# Patient Record
Sex: MALE | Race: WHITE | URBAN - METROPOLITAN AREA
[De-identification: names, ages, dates, MRNs, and addresses within clinical notes are randomized per-mention and may not be internally consistent; named-entity substitution may affect disease eponyms.]

---

## 2017-05-07 ENCOUNTER — OFFICE VISIT (OUTPATIENT)
Dept: FAMILY MEDICINE CLINIC | Facility: CLINIC | Age: 77
End: 2017-05-07

## 2017-05-07 VITALS
TEMPERATURE: 98 F | RESPIRATION RATE: 20 BRPM | SYSTOLIC BLOOD PRESSURE: 142 MMHG | HEART RATE: 72 BPM | WEIGHT: 233 LBS | BODY MASS INDEX: 34.51 KG/M2 | DIASTOLIC BLOOD PRESSURE: 82 MMHG | OXYGEN SATURATION: 97 % | HEIGHT: 69 IN

## 2017-05-07 DIAGNOSIS — I10 ELEVATED BLOOD PRESSURE READING WITH DIAGNOSIS OF HYPERTENSION: ICD-10-CM

## 2017-05-07 DIAGNOSIS — J22 ACUTE LOWER RESPIRATORY INFECTION: Primary | ICD-10-CM

## 2017-05-07 PROCEDURE — 99202 OFFICE O/P NEW SF 15 MIN: CPT | Performed by: PHYSICIAN ASSISTANT

## 2017-05-07 RX ORDER — LEVOTHYROXINE SODIUM 0.15 MG/1
TABLET ORAL
Refills: 0 | COMMUNITY
Start: 2017-03-29

## 2017-05-07 RX ORDER — AMLODIPINE BESYLATE 5 MG/1
TABLET ORAL
Refills: 2 | COMMUNITY
Start: 2017-03-29

## 2017-05-07 RX ORDER — BENAZEPRIL HYDROCHLORIDE 20 MG/1
TABLET ORAL
Refills: 0 | COMMUNITY
Start: 2017-03-29

## 2017-05-07 RX ORDER — CODEINE PHOSPHATE AND GUAIFENESIN 10; 100 MG/5ML; MG/5ML
5 SOLUTION ORAL EVERY 6 HOURS PRN
Qty: 120 ML | Refills: 0 | Status: SHIPPED | OUTPATIENT
Start: 2017-05-07

## 2017-05-07 RX ORDER — AZITHROMYCIN 250 MG/1
TABLET, FILM COATED ORAL
Qty: 6 TABLET | Refills: 0 | Status: SHIPPED | OUTPATIENT
Start: 2017-05-07 | End: 2017-05-12

## 2017-05-07 NOTE — PROGRESS NOTES
CHIEF COMPLAINT:   Patient presents with:  Cough/URI      HPI:   Carlitos Wood is a 77year old male who presents for upper respiratory symptoms for  7 days.  Patient reports sore throat only at the beginning of sx's, cough with whitish colored sputum, cou LUNGS: clear to auscultation bilaterally, no wheezes or rhonchi. Breathing is non labored. + hacking cough with cough jags  CARDIO: RRR with systolic murmur (pt aware- not new murmur)  LYMPH:  No cervical or submandibular lymphadenopathy.         ASSESSMENT Air travels in and out of the lungs through the airways. The linings of these airways produce sticky mucus. This mucus traps particles that enter the lungs. Tiny structures called cilia then sweep the particles out of the airways.      Healthy airway: Airwa The main treatment for bronchitis is easing symptoms. Avoiding smoke, allergens, and other things that trigger coughing can often help. If the infection is bacterial, you may be given antibiotics. During the illness, it's important to get plenty of sleep. · Symptoms worsen, or new symptoms develop. · Breathing problems worsen or  become severe. · Symptoms don’t get better within a week, or within 3 days of taking antibiotics.    Date Last Reviewed: 6/18/2014  © 4059-2957 [employer]

## 2017-05-19 ENCOUNTER — TELEPHONE (OUTPATIENT)
Dept: FAMILY MEDICINE CLINIC | Facility: CLINIC | Age: 77
End: 2017-05-19

## 2017-05-19 NOTE — TELEPHONE ENCOUNTER
Pt was seen almost 2 weeks ago. Feeling much better, but still some cough at night, requesting refill on Cheritussin AC. Pt is back in [place], [place].   Advised he will need to be seen in [place] for refill medication as I can't prescribe a controlled substance i

## (undated) NOTE — MR AVS SNAPSHOT
[address] [place] [address] Joann Dale  [phone]               Thank you for choosing us for your health care visit with Jose Delgado PA-C. We are glad to serve you and happy to provide you with this summary of your visit.   P shortness of breath. Impaired cilia: Extra mucus impairs cilia, causing congestion and wheezing. Smoking makes the problem worse.    Making a diagnosis  A physical exam, health history, and certain tests help your healthcare provider make the diagnosis If you don’t, the bronchitis may come back. · Take them as directed. For instance, some medicines should be taken with food. · Ask your provider or pharmacist what side effects are common, and what to do about them.   Follow-up care  You should see your p guaiFENesin-codeine 100-10 MG/5ML Soln   Take 5 mL by mouth every 6 (six) hours as needed for cough.    Commonly known as:  CHERATUSSIN AC           Levothyroxine Sodium 150 MCG Tabs   TK 1 T PO QD   Commonly known as:  SYNTHROID                Where to [employer] dairy products with reduced content of saturated and total fat.    Dietary sodium reduction Reduce dietary sodium intake to <= 100 mmol per day (2.4 g sodium or 6 g sodium chloride)   Aerobic physical activity Regular aerobic physical activity (e.g., brisk Eat plenty of protein, keep the fat content low Sugars:  sodas and sports drinks, candies and desserts   Eat plenty of low-fat dairy products High fat meats and dairy   Choose whole grain products Foods high in sodium   Water is best for hydration Fast arianna